# Patient Record
Sex: MALE | Race: WHITE | Employment: OTHER | URBAN - METROPOLITAN AREA
[De-identification: names, ages, dates, MRNs, and addresses within clinical notes are randomized per-mention and may not be internally consistent; named-entity substitution may affect disease eponyms.]

---

## 2018-02-26 ENCOUNTER — HOSPITAL ENCOUNTER (OUTPATIENT)
Dept: PHYSICAL THERAPY | Age: 63
Discharge: HOME | End: 2018-02-26
Payer: COMMERCIAL

## 2018-02-26 PROCEDURE — 97162 PT EVAL MOD COMPLEX 30 MIN: CPT

## 2018-02-26 PROCEDURE — 97110 THERAPEUTIC EXERCISES: CPT

## 2018-02-26 NOTE — PROGRESS NOTES
[address] [facility] PHYS[facility]ERAP[facility] AT [address], [address] [place] [address] Road  Phone: [phone]   [phone]  PLAN OF CARE / [address] [place] [address] PHYSICAL THERAPY SERVICES  Patient Name: Seth Hines : 1955   Medical   Diagnosis: Right knee pain [M25.561] Treatment Diagnosis: Right knee pain [M25.561]   Onset Date: 28     Referral Source: Bk Norton DO Start of Care [facility]): 2018   Prior Hospitalization: See medical history Provider #: [id number]   Prior Level of Function: Independent w/ ADL's   Comorbidities: None   Medications: Verified on Patient Summary List   The Plan of Care and following information is based on the information from the initial evaluation.   ===========================================================================================  Assessment / key information:  Patient is a 65 y/o male presenting with chief c/o right knee pain secondary to a fall with twisting mechanism on 18. He also reports a bad sprain on the same ankle on 18. He reports an old skiing injury several decades ago in the right knee which was untreated. X-rays were unremarkable. Pt report posterior right knee pain and ant/lateral pain; worse with twisting and going from sit to stand. Pain reports from 3-10/10. Pt reports some relief with ice. Pt presents with increased right subtalar pronation and moderate superior/lateral right knee and lateral right ankle swelling. Gait is non-antalgic, but pt is unable to negotiate stairs reciprocally. Moderate deficits with single leg balance on involved leg. Hamstring and quad strength is 4/5. Pt is TTP to lateral>medial joint line and distal hamstring tendons. The patient was instructed in a home exercise program to address the above findings/deficits.  The patient should benefit from therapy services to progress toward functional goals and improve quality of life.  ===========================================================================================  History LOW Complexity : Zero comorbidities / personal factors that will impact the outcome / POC; Examination MEDIUM Complexity : 3 Standardized tests and measures addressing body structure, function, activity limitation and / or participation in recreation ; Presentation MEDIUM Complexity : Evolving with changing characteristics ; Decision Making MEDIUM Complexity : FOTO score of 26-74; Complexity LOW   Problem List: pain affecting function, decrease ROM, decrease strength, impaired gait/ balance, decrease ADL/ functional abilitiies, decrease activity tolerance and decrease flexibility/ joint mobility   Treatment Plan may include any combination of the following: Therapeutic exercise, Therapeutic activities, Physical agent/modality, Manual therapy, Patient education and Functional mobility training  Patient / Family readiness to learn indicated by: asking questions, trying to perform skills and interest  Persons(s) to be included in education: patient (P)  Barriers to Learning/Limitations: None  Measures taken:    Patient Goal (s): Less pain, more mobility   Patient self reported health status: fair  Rehabilitation Potential: good   Short Term Goals: To be accomplished in  4  treatments: Independent/compliant with long term HEP for ROM and lower extremity strength  Improve knee extension AROM to the same as the left knee to improve gait mechanics   Long Term Goals:  To be accomplished in  8  treatments:  Improve FOTO outcome score by 14% to indicate a significant improvement in ADL function  Able to negotiate stairs reciprocally with minimal use of railing  Able to stand on involved leg for 30 seconds to demonstrate balance/stability of the lower extremity  Frequency / Duration:   Patient to be seen  2  times per week for 4  weeks:  Patient / Caregiver education and instruction: activity modification, brace/ splint application and exercises  G-Codes (GP): NA  Therapist Signature: Bran Pineda PT, OCS Date: 1/83/3867   Certification Period: NA Time: 2:34 PM   ===========================================================================================  I certify that the above Physical Therapy Services are being furnished while the patient is under my care. I agree with the treatment plan and certify that this therapy is necessary. Physician Signature:        Date:       Time:     Please sign and return to [facility] or you may fax the signed copy to [phone]. Thank you.

## 2018-02-26 NOTE — PROGRESS NOTES
PT DAILY TREATMENT NOTE    Patient Name: Amy Zee  Date:2018  : 1955  [x]  Patient  Verified  Payor: Debi Holder / Plan: [address] [place] [address] PT / Product Type: Commerical /    In time:2:30  Out time:3:15  Total Treatment Time (min): 45  Total Timed Codes (min): 45  1:1 Treatment Time ( [address] only):    Visit #: 1 of 8    Treatment Area: R knee    SUBJECTIVE  Pain Level (0-10 scale): 3  Medication Changes: [x] No    [] Yes (see paper medication log)  Subjective functional status/changes:   [] No changes reported  See POC for subjective history    OBJECTIVE    Physical Therapy Evaluation - Knee    Posture: [] Varus    [] Valgus    [] Recurvatum        [] Tibial Torsion    [] Foot Supination    [x] Foot Pronation R>L    Describe:    Gait:  [] Normal    [] Abnormal    [] Antalgic    [] NWB    Device:    Describe:    ROM / Strength  [] Unable to assess                  AROM                      PROM                   Strength (1-5)    Left Right Left Right Left Right   Hip Flexion          Extension          Abduction      4    Adduction      4   Knee Flexion -2 -7 p!   5 4    Extension 125 125 p!   5 4   Ankle Plantarflexion          Dorsiflexion           Flexibility:  Other: Mod/severe deficits of the right hamstring, ITB, gastroc/soleus    Palpation:   Neg/Pos  Neg/Pos  Neg/Pos   Joint Line Med/lat Quad tendon  Patellar ligament    Patella  Fibular head  Pes Anserinus    Tibial tubercle  Hamstring tendons + Infrapatellar fat pad      Optional Tests:  Patellar position/mobility:    Girth Measurements:     Cm at  Cm above joint line   Cm at   Cm below joint line  Cm at joint line   Left        Right           Lachmans  [] Neg    [] Pos Posterior Drawer [] Neg    [] Pos  Pivot Shift  [] Neg    [] Pos Posterior Sag  [] Neg    [] Pos  MIRANDA   [] Neg    [] Pos Lázaro's Test [] Neg    [] Pos  ALRI   [] Neg    [] Pos Squat   [] Neg    [x] Pos  Valgus@ 0 Degrees [] Neg    [] Pos Gavin [] Neg    [] Pos  Valgus@ 30 Degrees [] Neg    [] Pos Patellar Apprehension [] Neg    [] Pos  Varus@ 0 Degrees [x] Neg    [] Pos Cervantes's Compression [] Neg    [] Pos  Varus@ 30 Degrees [] Neg    [] Pos Ely's Test  [] Neg    [] Pos  Apley's Compression [] Neg    [] Pos Margi's Test  [] Neg    [] Pos  Apley's Distraction [x] Neg    [] Pos Stroke Test  [] Neg    [] Pos   Anterior Drawer [x] Neg    [] Pos Fluctuation Test [] Neg    [] Pos  Other:                  [] Neg    [] Pos                 OBJECTIVE TREATMENT  Modality (rationale):   []  E-Stim: type _ x _ min     []att   []unatt   []w/US   []w/ice   []w/heat  []  Traction: []cerv   []pelvic   _ lbs x _ min     []pro   []sup   []int   []const  []  Ultrasound: []cont   []pulse    _ W/cm2 x _  min   []1MHz   []3MHz  []  Iontophoresis: []take home patch w/ dexamethazone    []40mA   []80mA                               []_ mA min w/: []dexamethazone   []other:_  []  Ice pack _  min     [] Hot pack _  min     [] Paraffin _  min  []  Other:     Therapeutic Exercise: [] see flow sheet     [] Other:_ (minutes) :12  Added/Changed Exercises:  [x]  Added:_See HEP  to improve (function): improve the patient's ability to perform gait and other dynamic movement activities  []  Changed:_ to improve (function):    Pain Level (0-10 scale) post treatment: 3    ASSESSMENT  [x]  See Plan of Care    PLAN  See Plan of Ariel 33, PT 2/26/2018  2:34 PM

## 2018-03-01 ENCOUNTER — HOSPITAL ENCOUNTER (OUTPATIENT)
Dept: PHYSICAL THERAPY | Age: 63
Discharge: HOME | End: 2018-03-01
Payer: COMMERCIAL

## 2018-03-01 PROCEDURE — 97140 MANUAL THERAPY 1/> REGIONS: CPT

## 2018-03-01 PROCEDURE — 97110 THERAPEUTIC EXERCISES: CPT

## 2018-03-01 NOTE — PROGRESS NOTES
PT DAILY TREATMENT NOTE 8-    Patient Name: Haylie Rodríguez  Date:3/1/2018  : 1955  [x]  Patient  Verified  Payor: Mahi Hein / Plan: [facility]  CAPITATED PT / Product Type: Commerical /    In time:7:23  Out time:8:33  Total Treatment Time (min): 70  Total Timed Codes (min): 64  1:1 Treatment Time (min):    Visit #: 2 of 8    Treatment Area: Right knee pain [M25.561]    SUBJECTIVE  Pain Level (0-10 scale): 0  Any medication changes, allergies to medications, adverse drug reactions, diagnosis change, or new procedure performed?: [x] No    [] Yes (see summary sheet for update)  Subjective functional status/changes:   [] No changes reported  My biggest problem is straightening my knee all the way and going up and down the stairs as well as getting in and out of the car. OBJECTIVE      58 min Therapeutic Exercise:  [x] See flow sheet :   Rationale: increase ROM, increase strength, improve balance and increase proprioception to improve the patients ability to improve functional abilities    12 min Manual Therapy:  Instrument assisted soft tissue mobilization applied to right distal hamstring insertion/popliteal fossa and peripatellar region using GT 3&4   Rationale: increase ROM and increase tissue extensibility to improve functional mobility           min Patient Education: [x] Review HEP    [] Progressed/Changed HEP based on:   [] positioning   [] body mechanics   [] transfers   [] heat/ice application        Other Objective/Functional Measures:     Pain Level (0-10 scale) post treatment: 0    ASSESSMENT/Changes in Function: Pt tolerated all new therex fairly well upon trial today including increasing to 1 lb with 3 way SLR's with min to mod challenge today. Pt presents with chief c/o increased knee pain with TKE as well as difficulty with ascending/descending stairs and car transfers. Pt needs the most focus on knee extension mobility and quad strengthening/eccentric control. Will continue to progress/advance patient within current POC as tolerated with monitoring symptoms. Patient will continue to benefit from skilled PT services to modify and progress therapeutic interventions, address functional mobility deficits, address ROM deficits, address strength deficits, analyze and address soft tissue restrictions and analyze and cue movement patterns to attain remaining goals.      []  See Plan of Care  []  See progress note/recertification  []  See Discharge Summary         Progress towards goals / Updated goals:      PLAN  [x]  Upgrade activities as tolerated     []  Continue plan of care  []  Update interventions per flow sheet       []  Discharge due to:_  []  Other:_      Dirk Lee, THOMAS 3/1/2018  7:34 AM

## 2018-03-06 ENCOUNTER — HOSPITAL ENCOUNTER (OUTPATIENT)
Dept: PHYSICAL THERAPY | Age: 63
Discharge: HOME | End: 2018-03-06
Payer: COMMERCIAL

## 2018-03-06 PROCEDURE — 97110 THERAPEUTIC EXERCISES: CPT

## 2018-03-06 NOTE — PROGRESS NOTES
PT DAILY TREATMENT NOTE     Patient Name: Marcella Novoa  Date:3/6/2018  : 1955  [x]  Patient  Verified  Payor: Olman Thornton / Plan: [place] [employer]  CAPITATED PT / Product Type: Commerical /    In time:7:02  Out time:7:57  Total Treatment Time (min): 55  Total Timed Codes (min): 49  1:1 Treatment Time (min):    Visit #: 3 of 8    Treatment Area: Right knee pain [M25.561]    SUBJECTIVE  Pain Level (0-10 scale): 3/10  Any medication changes, allergies to medications, adverse drug reactions, diagnosis change, or new procedure performed?: [x] No    [] Yes (see summary sheet for update)  Subjective functional status/changes:   [] No changes reported  My knee was hurting after I was here last time and it is still hurting a little bit, I think that it was from doing all of those new exercises trying to straighten it out. OBJECTIVE      45 min Therapeutic Exercise:  [x] See flow sheet :   Rationale: increase ROM, increase strength, improve balance and increase proprioception to improve the patients ability to improve functional abilities    10 min Manual Therapy:  Instrument assisted soft tissue mobilization applied to right distal hamstring insertion/popliteal fossa and peripatellar region using GT 3&4 including with self strap hamstring stretch   Rationale: increase ROM and increase tissue extensibility to improve functional mobility           min Patient Education: [x] Review HEP    [] Progressed/Changed HEP based on:   [] positioning   [] body mechanics   [] transfers   [] heat/ice application        Other Objective/Functional Measures:     Pain Level (0-10 scale) post treatment: -5/10    ASSESSMENT/Changes in Function: Pt presented with chief c/o increased mild post exercise soreness/pain after focusing on knee extension mobility last treatment. Pt did report increased benefit from addition of self hamstring stretch combined with Graston Therapy today. Pt was also able to advance to addition of miniband side steps and from 4\" to 6\" step with step ups with min to mod challenge today. Will continue to progress/advance patient within current POC as tolerated with monitoring symptoms. Patient will continue to benefit from skilled PT services to modify and progress therapeutic interventions, address functional mobility deficits, address ROM deficits, address strength deficits, analyze and address soft tissue restrictions and analyze and cue movement patterns to attain remaining goals.      []  See Plan of Care  []  See progress note/recertification  []  See Discharge Summary         Progress towards goals / Updated goals:      PLAN  [x]  Upgrade activities as tolerated     []  Continue plan of care  []  Update interventions per flow sheet       []  Discharge due to:_  []  Other:_      Lauri Norton PTA 3/6/2018  7:04 AM

## 2018-03-08 ENCOUNTER — HOSPITAL ENCOUNTER (OUTPATIENT)
Dept: PHYSICAL THERAPY | Age: 63
Discharge: HOME | End: 2018-03-08
Payer: COMMERCIAL

## 2018-03-08 PROCEDURE — 97110 THERAPEUTIC EXERCISES: CPT

## 2018-03-08 PROCEDURE — 97140 MANUAL THERAPY 1/> REGIONS: CPT

## 2018-03-08 NOTE — PROGRESS NOTES
PT DAILY TREATMENT NOTE     Patient Name: Ester Urbina  Date:3/8/2018  : 1955  [x]  Patient  Verified  Payor: Donaldo Muhammad / Plan: [employer]  CAPITATED PT / Product Type: Commerical /    In time:7:00  Out time:8:01  Total Treatment Time (min): 61  Total Timed Codes (min): 55  1:1 Treatment Time (min):    Visit #: 4 of 8    Treatment Area: Right knee pain [M25.561]    SUBJECTIVE  Pain Level (0-10 scale): 3/10  Any medication changes, allergies to medications, adverse drug reactions, diagnosis change, or new procedure performed?: [x] No    [] Yes (see summary sheet for update)  Subjective functional status/changes:   [] No changes reported  My knee and hip is mainly just sore, but I think that it was partially from the new exercises and upgrades you gave me last time. OBJECTIVE      49 min Therapeutic Exercise:  [x] See flow sheet :   Rationale: increase ROM, increase strength, improve balance and increase proprioception to improve the patients ability to improve functional abilities    12 min Manual Therapy:  Instrument assisted soft tissue mobilization applied to right distal hamstring insertion/popliteal fossa and peripatellar region using GT 3&4 including with self strap hamstring stretch   Rationale: increase ROM and increase tissue extensibility to improve functional mobility           min Patient Education: [x] Review HEP    [] Progressed/Changed HEP based on:   [] positioning   [] body mechanics   [] transfers   [] heat/ice application        Other Objective/Functional Measures:     Pain Level (0-10 scale) post treatment: 2/10    ASSESSMENT/Changes in Function: Patient reports minimal to no overall improvement from therapy due to increased pain since initial evaluation. Pt presented with chief c/o increased right hip and knee post exercise soreness after increasing therex regiment last tx, but was able to tolerate same regiment again today including increasing from 1 to 2 lbs with 3 way SLR's with min to mod challenge today. Will continue to progress/advance patient within current POC as tolerated with monitoring symptoms. Patient will continue to benefit from skilled PT services to modify and progress therapeutic interventions, address functional mobility deficits, address ROM deficits, address strength deficits, analyze and address soft tissue restrictions and analyze and cue movement patterns to attain remaining goals.      []  See Plan of Care  []  See progress note/recertification  []  See Discharge Summary         Progress towards goals / Updated goals:      PLAN  [x]  Upgrade activities as tolerated     []  Continue plan of care  []  Update interventions per flow sheet       []  Discharge due to:_  []  Other:_      Albert Gallegos, THOMAS 3/8/2018  7:03 AM

## 2018-03-13 ENCOUNTER — HOSPITAL ENCOUNTER (OUTPATIENT)
Dept: PHYSICAL THERAPY | Age: 63
Discharge: HOME | End: 2018-03-13
Payer: COMMERCIAL

## 2018-03-13 PROCEDURE — 97140 MANUAL THERAPY 1/> REGIONS: CPT

## 2018-03-13 PROCEDURE — 97110 THERAPEUTIC EXERCISES: CPT

## 2018-03-13 NOTE — PROGRESS NOTES
PT DAILY TREATMENT NOTE     Patient Name: Kailash Decker  Date:3/13/2018  : 1955  [x]  Patient  Verified  Payor: Robb Lake / Plan: [facility] [employer]  CAPITATED PT / Product Type: Commerical /    In time:7:00  Out time:8:00  Total Treatment Time (min): 60  Total Timed Codes (min): 54  1:1 Treatment Time (min):    Visit #: 5 of 8    Treatment Area: Right knee pain [M25.561]    SUBJECTIVE  Pain Level (0-10 scale): 2/10  Any medication changes, allergies to medications, adverse drug reactions, diagnosis change, or new procedure performed?: [x] No    [] Yes (see summary sheet for update)  Subjective functional status/changes:   [] No changes reported  My knee has been feeling pretty good, but the outside of my ankle has been bothering me the most lately. OBJECTIVE    48 min Therapeutic Exercise:  [x] See flow sheet :   Rationale: increase ROM, increase strength, improve balance and increase proprioception to improve the patients ability to improve functional abilities    12 min Manual Therapy:  Instrument assisted soft tissue mobilization applied to right distal hamstring insertion/popliteal fossa and peripatellar region using GT 1,3&4 including with self strap hamstring stretch   Rationale: increase ROM and increase tissue extensibility to improve functional mobility            min Patient Education: [x] Review HEP    [] Progressed/Changed HEP based on:   [] positioning   [] body mechanics   [] transfers   [] heat/ice application        Other Objective/Functional Measures:     Pain Level (0-10 scale) post treatment: 4/10    ASSESSMENT/Changes in Function: Pt presented with chief c/o increased pain in right lateral ankle>knee since last tx, but was able to tolerate full normal therex regiment with min to mod challenge today. Pt was also able to advance to addition of foam with sharpened Romberg stance and SLS on level surface with moderate challenge with proprioceptive/balance awareness today. Will continue to progress/advance patient within current POC as tolerated with monitoring symptoms. Patient will continue to benefit from skilled PT services to modify and progress therapeutic interventions, address functional mobility deficits, address ROM deficits, address strength deficits, analyze and address soft tissue restrictions and analyze and cue movement patterns to attain remaining goals. []  See Plan of Care  []  See progress note/recertification  []  See Discharge Summary         Progress towards goals / Updated goals:  STG #2=Improve knee extension AROM to the same as the left knee to improve gait mechanics:Improved, but not fully met, Right knee extension AROM= -7 degrees;  Left knee= -5 degrees    PLAN  [x]  Upgrade activities as tolerated     []  Continue plan of care  []  Update interventions per flow sheet       []  Discharge due to:_  []  Other:_      Shimon Rodriguez PTA 3/13/2018  7:05 AM

## 2018-03-15 ENCOUNTER — HOSPITAL ENCOUNTER (OUTPATIENT)
Dept: PHYSICAL THERAPY | Age: 63
Discharge: HOME | End: 2018-03-15
Payer: COMMERCIAL

## 2018-03-15 PROCEDURE — 97110 THERAPEUTIC EXERCISES: CPT

## 2018-03-15 PROCEDURE — 97112 NEUROMUSCULAR REEDUCATION: CPT

## 2018-03-15 PROCEDURE — 97140 MANUAL THERAPY 1/> REGIONS: CPT

## 2018-03-15 NOTE — PROGRESS NOTES
PT DAILY TREATMENT NOTE 8    Patient Name: Hector Dickens  Date:3/15/2018  : 1955  [x]  Patient  Verified  Payor: Torrie Fernandez / [place]: [employer]  CAPITATED PT / Product Type: Commerical /    In time:6:57  Out time:7:48  Total Treatment Time (min): 51  Total Timed Codes (min): 51  1:1 Treatment Time (min):    Visit #: 6 of 8    Treatment Area: Right knee pain [M25.561]    SUBJECTIVE  Pain Level (0-10 scale): 2/10  Any medication changes, allergies to medications, adverse drug reactions, diagnosis change, or new procedure performed?: [x] No    [] Yes (see summary sheet for update)  Subjective functional status/changes:   [] No changes reported  Patient reports having 20cc of fluid drained yesterday from knee. OBJECTIVE    35 min Therapeutic Exercise:  [x] See flow sheet :   Rationale: increase strength and improve coordination to improve the patients ability to perform gait and other dynamic movement activities     8 min Neuromuscular Re-education:  [x]  See flow sheet :   Rationale: improve coordination, improve balance and increase proprioception  to improve the patients ability to perform gait and other dynamic movement activities    8 min Manual Therapy:   Instrument assisted soft tissue mobilization applied to Right hamstring, IT band and calf using GT 1, 2, and 4 instruments     Rationale: increase tissue extensibility to perform gait and other dynamic movement activities      Other Objective/Functional Measures:     Pain Level (0-10 scale) post treatment: 1/10    ASSESSMENT/Changes in Function: Continue to progress resistance and difficulty in treatment exercises. Patient will continue to benefit from skilled PT services to address ROM deficits, address strength deficits and analyze and address soft tissue restrictions to attain remaining goals.      PLAN  [x]  Upgrade activities as tolerated     [x]  Continue plan of care  []  Update interventions per flow sheet       []  Discharge due to:_  [] Other:_      Loly Ludwig, PT 3/15/2018  7:24 AM

## 2018-03-20 ENCOUNTER — HOSPITAL ENCOUNTER (OUTPATIENT)
Dept: PHYSICAL THERAPY | Age: 63
Discharge: HOME | End: 2018-03-20
Payer: COMMERCIAL

## 2018-03-20 PROCEDURE — 97110 THERAPEUTIC EXERCISES: CPT

## 2018-03-20 NOTE — PROGRESS NOTES
PT DAILY TREATMENT NOTE     Patient Name: Luis Marie  Date:3/20/2018  : 1955  [x]  Patient  Verified  Payor: Vladimir Medina / [place] [place] [employer]  [facility] Type: Commer[facility] /    In time:7:02  Out time:7:55  Total Treatment Time (min): 53  Total Timed Codes (min): 47  1:1 Treatment Time (min):    Visit #: 7 of 8    Treatment Area: Right knee pain [M25.561]    SUBJECTIVE  Pain Level (0-10 scale): 0  Any medication changes, allergies to medications, adverse drug reactions, diagnosis change, or new procedure performed?: [x] No    [] Yes (see summary sheet for update)  Subjective functional status/changes:   [] No changes reported  My knee has been feeling a lot better since the doctor took the fluid out of it right before I was here last, I can even walk and go up and down the stairs a lot better now. OBJECTIVE      53 min Therapeutic Exercise:  [x] See flow sheet :   Rationale: increase ROM, increase strength, improve balance and increase proprioception to improve the patients ability to improve functional abilities         min Patient Education: [x] Review HEP    [] Progressed/Changed HEP based on:   [] positioning   [] body mechanics   [] transfers   [] heat/ice application        Other Objective/Functional Measures:     Pain Level (0-10 scale) post treatment: 0    ASSESSMENT/Changes in Function: Pt reports the most functional improvement with increased ease and decreased pain/sxs with prolonged ambulation and ascending/descending stairs over the past 2 treatments. Pt was also able to advance from 6\" to 8\" step with step ups as well as addition of STAR taps on foam with min to mod challenge today. Will continue to progress/advance patient within current POC as tolerated with monitoring symptoms.     Patient will continue to benefit from skilled PT services to modify and progress therapeutic interventions, address functional mobility deficits, address ROM deficits, address strength deficits, analyze and address soft tissue restrictions and analyze and cue movement patterns to attain remaining goals. []  See Plan of Care  []  See progress note/recertification  []  See Discharge Summary         Progress towards goals / Updated goals: Will review detailed progress/LTGs for planned discharge next visit.     PLAN  [x]  Upgrade activities as tolerated     []  Continue plan of care  []  Update interventions per flow sheet       []  Discharge due to:_  []  Other:_      Indigo Zambrano, PTA 3/20/2018  6:56 AM

## 2018-03-22 ENCOUNTER — HOSPITAL ENCOUNTER (OUTPATIENT)
Dept: PHYSICAL THERAPY | Age: 63
Discharge: HOME | End: 2018-03-22
Payer: COMMERCIAL

## 2018-03-22 PROCEDURE — 97140 MANUAL THERAPY 1/> REGIONS: CPT

## 2018-03-22 PROCEDURE — 97110 THERAPEUTIC EXERCISES: CPT

## 2018-03-22 NOTE — PROGRESS NOTES
[facility] AT [address], [address] [place] [address] Road  Phone: [phone]   [phone]  PROGRESS NOTE  Patient Name: Rhonda Frank : 1955   Treatment/Medical Diagnosis: Right knee pain [M25.561]   Referral Source: Tj Castillo DO     Date of Initial Visit: 18 Attended Visits: 8 Missed Visits: 0     SUMMARY OF TREATMENT  Therapeutic exercise for Right LE/knee focused mobility and strengthening, LE biomechanical symmetry, manual intervention and HEP. CURRENT STATUS  Patient reports approximately 65% overall improvement from therapy since initial evaluation with 2/10 pain level on average, increased to 3-4/10 at the worst since recent fall down the stairs since last treatment. Pt was making excellent progress with advancing within current POC up until recent fall. Pt however is making steady gains with knee mobility and strengthening with advancing within current POC including increased quadriceps endurance, but still needs work on hamstring strength. Pt would benefit from continued therapy for 6 additional visits to achieve maximum medical benefit/potential from current POC. Will continue to progress/advance patient within current POC as tolerated with monitoring symptoms. Right knee AROM= -7-124 degrees   Right knee strength measurements/MMT= Quads=4+/5; Hamstrings=4/5    Goal/Measure of Progress Goal Met? 1. Improve knee extension AROM to the same as the left knee to improve gait mechanics   Status at last Eval: Progressing Current Status: Improved, but Not Met no   2. Improve FOTO outcome score by 14% to indicate a significant improvement in ADL function   Status at last Eval: 48/100 Current Status: 50/100 no   3. Able to negotiate stairs reciprocally with minimal use of railing   Status at last Eval: Progressing Current Status: Met yes   4.   Able to stand on involved leg for 30 seconds to demonstrate balance/stability of the lower extremity   Status at last Eval: Progressing Current Status: Improved, able to sustain for 10-15 seconds no     New Goals to be achieved in __6__  treatments:  1. Improve knee extension AROM to the same as the left knee to improve gait mechanics   2. Improve FOTO outcome score by 14% to indicate a significant improvement in ADL function   3. Able to stand on involved leg with single leg stance on foam for 30 seconds to demonstrate balance/stability of the lower extremity   4. Pt will demonstrate 4+/5 hamstring strength on involved LE to indicate increased functional strength with ADL's. RECOMMENDATIONS  Continue with current POC for 6 additional visits with advancing as tolerated, then reassess for the need for discharge from therapy as planned/discussed. If you have any questions/comments please contact us directly at [phone]. Thank you for allowing us to assist in the care of your patient. LPTA Signature: Crystal Calles PTA  Date: 3/22/2018   PT Signature: MILEY Amaya, [facility]   Time: 7:09 AM   NOTE TO PHYSICIAN:  PLEASE COMPLETE THE ORDERS BELOW AND FAX TO   [facility] at [facility]: [phone]. If you are unable to process this request in 24 hours please contact our office: [phone].    ___ I have read the above report and request that my patient continue as recommended.   ___ I have read the above report and request that my patient continue therapy with the following changes/special instructions:_________________________________________________________   ___ I have read the above report and request that my patient be discharged from therapy.      Physician Signature:        Date:       Time:

## 2018-03-22 NOTE — PROGRESS NOTES
PT DAILY TREATMENT NOTE     Patient Name: Marcella Novoa  Date:3/22/2018  : 1955  [x]  Patient  Verified  Payor: Olman Thornton / [facility] [employer]  [facility] Type: Commer[facility] /    In time:7:01  Out time:8:27  Total Treatment Time (min): 86  Total Timed Codes (min): 80  1:1 Treatment Time (min):    Visit #: 8 of 8    Treatment Area: Right knee pain [M25.561]    SUBJECTIVE  Pain Level (0-10 scale): 3/10  Any medication changes, allergies to medications, adverse drug reactions, diagnosis change, or new procedure performed?: [x] No    [] Yes (see summary sheet for update)  Subjective functional status/changes:   [] No changes reported  My knee was doing really good overall until I fell down the stairs yesterday, I twisted my knee and ankle and it has been hurting ever since. OBJECTIVE      76 min Therapeutic Exercise:  [x] See flow sheet :   Rationale: increase ROM, increase strength, improve balance and increase proprioception to improve the patients ability to improve functional abilities    10 min Manual Therapy:  Instrument assisted soft tissue mobilization applied to Right hamstring, popliteal fossa and calf using GT 3&4 instruments   Rationale: increase ROM and increase tissue extensibility to improve functional mobility           min Patient Education: [x] Review HEP    [] Progressed/Changed HEP based on:   [] positioning   [] body mechanics   [] transfers   [] heat/ice application        Other Objective/Functional Measures:     Pain Level (0-10 scale) post treatment: 4/10    ASSESSMENT/Changes in Function:     Patient will continue to benefit from skilled PT services to modify and progress therapeutic interventions, address functional mobility deficits, address ROM deficits, address strength deficits, analyze and address soft tissue restrictions and analyze and cue movement patterns to attain remaining goals.      []  See Plan of Care  [x]  See progress note/recertification  []  See Discharge Summary         Progress towards goals / Updated goals:  See Progress note/Physician update for full detailed progress towards established goals.     PLAN  []  Upgrade activities as tolerated     []  Continue plan of care  []  Update interventions per flow sheet       []  Discharge due to:_  []  Other:_      Albert Gallegos PTA 3/22/2018  7:03 AM

## 2018-03-27 ENCOUNTER — HOSPITAL ENCOUNTER (OUTPATIENT)
Dept: PHYSICAL THERAPY | Age: 63
Discharge: HOME | End: 2018-03-27
Payer: COMMERCIAL

## 2018-03-27 PROCEDURE — 97140 MANUAL THERAPY 1/> REGIONS: CPT

## 2018-03-27 PROCEDURE — 97110 THERAPEUTIC EXERCISES: CPT

## 2018-03-27 NOTE — PROGRESS NOTES
PT DAILY TREATMENT NOTE     Patient Name: Michelle Gracia  Date:3/27/2018  : 1955  [x]  Patient  Verified  Payor: Pramod Gutierrez / Plan: [employer]  [facility]IT[facility] PT / Product Type: Commerical /    In time:7:03  Out time:8:10  Total Treatment Time (min): 67  Total Timed Codes (min): 61  1:1 Treatment Time (min):    Visit #:  14    Treatment Area: Right knee pain [M25.561]    SUBJECTIVE  Pain Level (0-10 scale): 3/10  Any medication changes, allergies to medications, adverse drug reactions, diagnosis change, or new procedure performed?: [x] No    [] Yes (see summary sheet for update)  Subjective functional status/changes:   [] No changes reported  My knee and the outside of my ankle is still hurting from the fall last week which is concerning me more than anything. OBJECTIVE      57 min Therapeutic Exercise:  [x] See flow sheet :   Rationale: increase ROM, increase strength, improve balance and increase proprioception to improve the patients ability to improve functional abilities    10 min Manual Therapy:  Instrument assisted soft tissue mobilization applied to Right hamstring, popliteal fossa and calf using GT 3&4 instruments   Rationale: increase ROM and increase tissue extensibility to improve functional mobility           min Patient Education: [x] Review HEP    [] Progressed/Changed HEP based on:   [] positioning   [] body mechanics   [] transfers   [] heat/ice application        Other Objective/Functional Measures:     Pain Level (0-10 scale) post treatment: 210    ASSESSMENT/Changes in Function: Pt presented with chief c/o posterior knee and lateral ankle pain/sxs since last treatment continued from recent fall down the stairs last week. Pt did report increased benefit from performing manual intervention at beginning of treatment before therex today. Will continue to progress/advance patient within current POC as tolerated with monitoring symptoms.     Patient will continue to benefit from skilled PT services to modify and progress therapeutic interventions, address functional mobility deficits, address ROM deficits, address strength deficits, analyze and address soft tissue restrictions and analyze and cue movement patterns to attain remaining goals.      []  See Plan of Care  []  See progress note/recertification  []  See Discharge Summary         Progress towards goals / Updated goals:      PLAN  [x]  Upgrade activities as tolerated     []  Continue plan of care  []  Update interventions per flow sheet       []  Discharge due to:_  []  Other:_      Magnus King PTA 3/27/2018  7:01 AM

## 2018-03-29 ENCOUNTER — HOSPITAL ENCOUNTER (OUTPATIENT)
Dept: PHYSICAL THERAPY | Age: 63
Discharge: HOME | End: 2018-03-29
Payer: COMMERCIAL

## 2018-03-29 PROCEDURE — 97110 THERAPEUTIC EXERCISES: CPT

## 2018-03-29 PROCEDURE — 97140 MANUAL THERAPY 1/> REGIONS: CPT

## 2018-03-29 NOTE — PROGRESS NOTES
PT DAILY TREATMENT NOTE     Patient Name: Romero Hobbs  Date:3/29/2018  : 1955  [x]  Patient  Verified  Payor: Mary Arciniega / Plan: [facility]  CAPITATED PT / Product Type: Commerical /    In time:7:00  Out time:8:04  Total Treatment Time (min): 64  Total Timed Codes (min): 58  1:1 Treatment Time (min):    Visit #: 10 of 14    Treatment Area: Right knee pain [M25.561]    SUBJECTIVE  Pain Level (0-10 scale): 210  Any medication changes, allergies to medications, adverse drug reactions, diagnosis change, or new procedure performed?: [x] No    [] Yes (see summary sheet for update)  Subjective functional status/changes:   [] No changes reported  The top of my ankle has been hurting a lot more than my knee, but I still feel the pain and tension in the back of my knee. OBJECTIVE      54 min Therapeutic Exercise:  [x] See flow sheet :   Rationale: increase ROM, increase strength, improve balance and increase proprioception to improve the patients ability to improve functional abilities    10 min Manual Therapy:   Instrument assisted soft tissue mobilization applied to Right hamstring, popliteal fossa and calf using GT 1,3&4 instruments   Rationale: increase ROM and increase tissue extensibility to improve functional mobility           min Patient Education: [x] Review HEP    [] Progressed/Changed HEP based on:   [] positioning   [] body mechanics   [] transfers   [] heat/ice application        Other Objective/Functional Measures:     Pain Level (0-10 scale) post treatment: 2/10    ASSESSMENT/Changes in Function: Pt presented with chief c/o dorsal bridge of foot/ankle > knee from fall approximately 1 1/2 - 2 weeks ago as well as difficulty with active TKE due to popliteal fossa region tension which is being focused on with manual intervention currently. Will continue to progress/advance patient within current POC as tolerated with monitoring symptoms.     Patient will continue to benefit from skilled PT services to modify and progress therapeutic interventions, address functional mobility deficits, address ROM deficits, address strength deficits, analyze and address soft tissue restrictions and analyze and cue movement patterns to attain remaining goals.      []  See Plan of Care  []  See progress note/recertification  []  See Discharge Summary         Progress towards goals / Updated goals:      PLAN  [x]  Upgrade activities as tolerated     []  Continue plan of care  []  Update interventions per flow sheet       []  Discharge due to:_  []  Other:_      Miranda Mane PTA 3/29/2018  7:02 AM

## 2018-04-03 ENCOUNTER — HOSPITAL ENCOUNTER (OUTPATIENT)
Dept: PHYSICAL THERAPY | Age: 63
Discharge: HOME | End: 2018-04-03
Payer: COMMERCIAL

## 2018-04-03 PROCEDURE — 97110 THERAPEUTIC EXERCISES: CPT

## 2018-04-03 PROCEDURE — 97140 MANUAL THERAPY 1/> REGIONS: CPT

## 2018-04-03 NOTE — PROGRESS NOTES
PT DAILY TREATMENT NOTE     Patient Name: Romero Hobbs  Date:4/3/2018  : 1955  [x]  Patient  Verified  Payor: Mary Arciniega / Plan: [facility]  CAPITATED PT / Product Type: Commerical /    In time:7:00  Out time:8:13  Total Treatment Time (min): 73  Total Timed Codes (min): 67  1:1 Treatment Time (min):    Visit #:     Treatment Area: Right knee pain [M25.561]    SUBJECTIVE  Pain Level (0-10 scale): 1/10  Any medication changes, allergies to medications, adverse drug reactions, diagnosis change, or new procedure performed?: [x] No    [] Yes (see summary sheet for update)  Subjective functional status/changes:   [] No changes reported  I started taking the naproxen for my ankle since I was here last and it has been helping a lot, I still feel it but it's not nearly as bad as it used to be. OBJECTIVE    63 min Therapeutic Exercise:  [x] See flow sheet :   Rationale: increase ROM, increase strength, improve balance and increase proprioception to improve the patients ability to improve functional abilities    10 min Manual Therapy:   Instrument assisted soft tissue mobilization applied to Right hamstring, popliteal fossa and calf using GT 3&4 instruments   Rationale: increase ROM and increase tissue extensibility to improve functional mobility           min Patient Education: [x] Review HEP    [] Progressed/Changed HEP based on:   [] positioning   [] body mechanics   [] transfers   [] heat/ice application        Other Objective/Functional Measures:     Pain Level (0-10 scale) post treatment: 1-2/10    ASSESSMENT/Changes in Function: Pt presented with decreased intensity of knee and ankle pain since last tx and was able to increase from 3 to 4 lbs with 3 way SLR's with min to mod challenge today. Will continue to progress/advance patient within current POC as tolerated with monitoring symptoms.     Patient will continue to benefit from skilled PT services to modify and progress therapeutic interventions, address functional mobility deficits, address ROM deficits, address strength deficits, analyze and address soft tissue restrictions and analyze and cue movement patterns to attain remaining goals.      []  See Plan of Care  []  See progress note/recertification  []  See Discharge Summary         Progress towards goals / Updated goals:  Goal #3=Able to stand on involved leg with single leg stance on foam for 30 seconds to demonstrate balance/stability of the lower extremity:Not Met, Pt only able to sustain SLS for 14 seconds max    PLAN  [x]  Upgrade activities as tolerated     []  Continue plan of care  []  Update interventions per flow sheet       []  Discharge due to:_  []  Other:_      Philippe Tovar, THOMAS 4/3/2018  7:03 AM

## 2018-04-05 ENCOUNTER — HOSPITAL ENCOUNTER (OUTPATIENT)
Dept: PHYSICAL THERAPY | Age: 63
Discharge: HOME | End: 2018-04-05
Payer: COMMERCIAL

## 2018-04-05 PROCEDURE — 97110 THERAPEUTIC EXERCISES: CPT

## 2018-04-05 PROCEDURE — 97140 MANUAL THERAPY 1/> REGIONS: CPT

## 2018-04-05 NOTE — PROGRESS NOTES
PT DAILY TREATMENT NOTE     Patient Name: Anand Kelly  Date:2018  : 1955  [x]  Patient  Verified  Payor: Delonte Hodge / Plan: [employer]  [facility] / Product Type: Commerical /    In time:7:00  Out time:8:02  Total Treatment Time (min): 62  Total Timed Codes (min): 56  1:1 Treatment Time (min):    Visit #:     Treatment Area: Right knee pain [M25.561]    SUBJECTIVE  Pain Level (0-10 scale): 0  Any medication changes, allergies to medications, adverse drug reactions, diagnosis change, or new procedure performed?: [x] No    [] Yes (see summary sheet for update)  Subjective functional status/changes:   [] No changes reported  I don't really have any pain in my knee at all to speak of, it's just my ankle that feels wobbly and unstable. I think that I need to stay off of the ladders from now on. OBJECTIVE      52 min Therapeutic Exercise:  [x] See flow sheet :   Rationale: increase ROM, increase strength, improve balance and increase proprioception to improve the patients ability to improve functional abilities    10 min Manual Therapy:  Instrument assisted soft tissue mobilization applied to Right hamstring, popliteal fossa and calf using GT 3&4 instruments   Rationale: increase ROM and increase tissue extensibility to improve functional mobility           min Patient Education: [x] Review HEP    [] Progressed/Changed HEP based on:   [] positioning   [] body mechanics   [] transfers   [] heat/ice application        Other Objective/Functional Measures: Right knee AROM= -7-121 degrees measured in supine     Pain Level (0-10 scale) post treatment: 0    ASSESSMENT/Changes in Function: Pt is making slow but steady gains with progressing towards all established goals with knee mobility and strength with advancing within current POC. Will continue to progress/advance patient within current POC as tolerated with monitoring symptoms.     Patient will continue to benefit from skilled PT services to modify and progress therapeutic interventions, address functional mobility deficits, address ROM deficits, address strength deficits, analyze and address soft tissue restrictions and analyze and cue movement patterns to attain remaining goals.      []  See Plan of Care  []  See progress note/recertification  []  See Discharge Summary         Progress towards goals / Updated goals:  LTG #4=Pt will demonstrate 4+/5 hamstring strength on involved LE to indicate increased functional strength with ADL's:Met, right hamstring strength=5/5 currently     PLAN  [x]  Upgrade activities as tolerated     []  Continue plan of care  []  Update interventions per flow sheet       []  Discharge due to:_  []  Other:_      Zahra Miller, PTA 4/5/2018  7:02 AM

## 2018-04-10 ENCOUNTER — HOSPITAL ENCOUNTER (OUTPATIENT)
Dept: PHYSICAL THERAPY | Age: 63
Discharge: HOME | End: 2018-04-10
Payer: COMMERCIAL

## 2018-04-10 PROCEDURE — 97110 THERAPEUTIC EXERCISES: CPT

## 2018-04-10 NOTE — PROGRESS NOTES
PT DAILY TREATMENT NOTE     Patient Name: Vi Favorite  Date:4/10/2018  : 1955  [x]  Patient  Verified  Payor: Octavio Ferguson / Plan: [employer]  CAPITATED PT / Product Type: Commerical /    In time:7:00  Out time:8:02  Total Treatment Time (min): 62  Total Timed Codes (min): 56  1:1 Treatment Time (min):    Visit #:  14    Treatment Area: Right knee pain [M25.561]    SUBJECTIVE  Pain Level (0-10 scale): 0  Any medication changes, allergies to medications, adverse drug reactions, diagnosis change, or new procedure performed?: [x] No    [] Yes (see summary sheet for update)  Subjective functional status/changes:   [] No changes reported  My knee and ankle have both been feeling a lot better since I was here last and I haven't even been taking anything for it. OBJECTIVE      50 min Therapeutic Exercise:  [x] See flow sheet :   Rationale: increase ROM, increase strength, improve balance and increase proprioception to improve the patients ability to improve functional abilities    12 min Manual Therapy:   Instrument assisted soft tissue mobilization applied to Right hamstring, popliteal fossa and calf using GT 3&4 instruments   Rationale: increase ROM and increase tissue extensibility to improve functional mobility        min Patient Education: [x] Review HEP    [] Progressed/Changed HEP based on:   [] positioning   [] body mechanics   [] transfers   [] heat/ice application        Other Objective/Functional Measures:     Pain Level (0-10 scale) post treatment: 0    ASSESSMENT/Changes in Function: Pt reports minimal symptoms overall in knee and ankle since last treatment even with resuming normal standing/walking ADL's over the past few treatments. Pt was also able to increase resistance with theraband hamstring curls and increased reps with miniband side steps with min to mod challenge today. Pt agrees that he is near maximum medical benefit/potential from current POC and will be ready for discharge as planned next treatment. Patient will continue to benefit from skilled PT services to modify and progress therapeutic interventions, address functional mobility deficits, address ROM deficits, address strength deficits, analyze and address soft tissue restrictions and analyze and cue movement patterns to attain remaining goals. []  See Plan of Care  []  See progress note/recertification  []  See Discharge Summary         Progress towards goals / Updated goals: Will review detailed progress/LTGs for planned discharge next visit.     PLAN  [x]  Upgrade activities as tolerated     []  Continue plan of care  []  Update interventions per flow sheet       []  Discharge due to:_  []  Other:_      Alferd Runner, THOMAS 4/10/2018  6:57 AM

## 2018-04-12 ENCOUNTER — HOSPITAL ENCOUNTER (OUTPATIENT)
Dept: PHYSICAL THERAPY | Age: 63
Discharge: HOME | End: 2018-04-12
Payer: COMMERCIAL

## 2018-04-12 PROCEDURE — 97110 THERAPEUTIC EXERCISES: CPT

## 2018-04-12 NOTE — PROGRESS NOTES
PT DAILY TREATMENT NOTE     Patient Name: Marino Peña  Date:2018  : 1955  [x]  Patient  Verified  Payor: Madeline Christopher / Plan: [facility]  CAPITATED PT / Product Type: Commerical /    In time:7:02  Out time:8:02  Total Treatment Time (min): 60  Total Timed Codes (min): 54  1:1 Treatment Time (min):    Visit #: 14 of 14    Treatment Area: Right knee pain [M25.561]    SUBJECTIVE  Pain Level (0-10 scale): 0  Any medication changes, allergies to medications, adverse drug reactions, diagnosis change, or new procedure performed?: [x] No    [] Yes (see summary sheet for update)  Subjective functional status/changes:   [] No changes reported  I saw the doctor yesterday and he checked out my knee with pulling on it and bending it, so it was a little bit more sore than usual from that. OBJECTIVE      60 min Therapeutic Exercise:  [x] See flow sheet :   Rationale: increase ROM, increase strength, improve balance and increase proprioception to improve the patients ability to improve functional abilities         min Patient Education: [x] Review HEP    [] Progressed/Changed HEP based on:   [] positioning   [] body mechanics   [] transfers   [] heat/ice application        Other Objective/Functional Measures:     Pain Level (0-10 scale) post treatment: 0    ASSESSMENT/Changes in Function:      []  See Plan of Care  []  See progress note/recertification  [x]  See Discharge Summary         Progress towards goals / Updated goals:  See discharge summary for full final detailed progress towards all established goals. PLAN  []  Upgrade activities as tolerated     []  Continue plan of care  []  Update interventions per flow sheet       [x]  Discharge due to:Patient has achieved maximum medical benefit/potential from current POC after completing 14 of 14 prescribed visits and is ready for discharge from therapy at this time.   []  Other:_      Cady Carbone, PTA 2018  7:03 AM

## 2018-04-12 NOTE — PROGRESS NOTES
[facility] AT [address], [address] [place] [address]  Phone: [phone]   [phone]  DISCHARGE SUMMARY  Patient Name: Carlito Pace : 1955   Treatment/Medical Diagnosis: Right knee pain [M25.561]   Referral Source: Ramsey Arredondo DO     Date of Initial Visit: 18 Attended Visits: 14 Missed Visits: 0     SUMMARY OF TREATMENT  Therapeutic exercise for Right LE/knee focused mobility and strengthening, LE biomechanical symmetry, manual intervention and HEP. CURRENT STATUS  Patient reports approximately 90-95% overall improvement from therapy since initial evaluation with 2-3/10 pain level at the worst with prolonged static positioning. Pt has made excellent progress with gaining knee mobility as well as with increased knee strength and stability with advancing within current POC. Pt agrees that he has achieved maximum medical benefit/potential with current POC and is appropriate for discharge from therapy at this time after completing 14 of 14 prescribed visits. Pt was given and instructed in an updated HEP for continued self maintenance of reduced sxs after D/C from therapy. Right knee AROM= -6-120 degrees   Right knee strength measurements/MMT= Quads=4+/5; Hamstrings=5/5    Goal/Measure of Progress Goal Met? 1. Improve knee extension AROM to the same as the left knee to improve gait mechanics   Status at last Eval: Improved, but Not Met Current Status: Improved, but Not Met   Knee Extension AROM=   Right= -6 degrees  Left= -4 degrees  (measured in supine) no   2. Improve FOTO outcome score by 14% to indicate a significant improvement in ADL function   Status at last Eval: 50/100 (48/100 at initial evaluation) Current Status: 80/100 yes   3.   Able to stand on involved leg with single leg stance on foam for 30 seconds to demonstrate balance/stability of the lower extremity   Status at last Eval: Progressing Current Status: Not Met,Able to sustain for 25 seconds max with moderate balance deviations no   4. Pt will demonstrate 4+/5 hamstring strength on involved LE to indicate increased functional strength with ADL's. Status at last Eval: Progressing  Current Status: Met yes     RECOMMENDATIONS  Patient has achieved maximum medical benefit/potential from current POC after completing 14 of 14 prescribed visits and is ready for discharge from therapy at this time. If you have any questions/comments please contact us directly at [phone]. Thank you for allowing us to assist in the care of your patient.     LPTA Signature: Tiffanie Clarke PTA Date: 4/12/18   Therapist Signature: MILEY Steiner, OCS Time: 7:10 AM

## 2018-04-17 ENCOUNTER — APPOINTMENT (OUTPATIENT)
Dept: PHYSICAL THERAPY | Age: 63
End: 2018-04-17
Payer: COMMERCIAL

## 2018-04-19 ENCOUNTER — APPOINTMENT (OUTPATIENT)
Dept: PHYSICAL THERAPY | Age: 63
End: 2018-04-19
Payer: COMMERCIAL

## 2018-04-24 ENCOUNTER — APPOINTMENT (OUTPATIENT)
Dept: PHYSICAL THERAPY | Age: 63
End: 2018-04-24
Payer: COMMERCIAL

## 2018-04-26 ENCOUNTER — APPOINTMENT (OUTPATIENT)
Dept: PHYSICAL THERAPY | Age: 63
End: 2018-04-26
Payer: COMMERCIAL